# Patient Record
Sex: FEMALE | Race: WHITE | NOT HISPANIC OR LATINO | Employment: FULL TIME | URBAN - METROPOLITAN AREA
[De-identification: names, ages, dates, MRNs, and addresses within clinical notes are randomized per-mention and may not be internally consistent; named-entity substitution may affect disease eponyms.]

---

## 2018-03-13 ENCOUNTER — OFFICE VISIT (OUTPATIENT)
Dept: URGENT CARE | Facility: CLINIC | Age: 43
End: 2018-03-13
Payer: COMMERCIAL

## 2018-03-13 VITALS
SYSTOLIC BLOOD PRESSURE: 150 MMHG | OXYGEN SATURATION: 98 % | TEMPERATURE: 96.9 F | RESPIRATION RATE: 16 BRPM | HEART RATE: 77 BPM | DIASTOLIC BLOOD PRESSURE: 84 MMHG

## 2018-03-13 DIAGNOSIS — H83.09 LABYRINTHITIS, UNSPECIFIED LATERALITY: Primary | ICD-10-CM

## 2018-03-13 PROCEDURE — 99203 OFFICE O/P NEW LOW 30 MIN: CPT | Performed by: PHYSICIAN ASSISTANT

## 2018-03-13 RX ORDER — TAMOXIFEN CITRATE 20 MG/1
20 TABLET ORAL
COMMUNITY
Start: 2017-03-10

## 2018-03-13 RX ORDER — MECLIZINE HYDROCHLORIDE 25 MG/1
25 TABLET ORAL EVERY 8 HOURS PRN
Qty: 30 TABLET | Refills: 0 | Status: SHIPPED | OUTPATIENT
Start: 2018-03-13 | End: 2022-05-18

## 2018-03-13 RX ORDER — ONDANSETRON 4 MG/1
4 TABLET, ORALLY DISINTEGRATING ORAL EVERY 8 HOURS PRN
Qty: 10 TABLET | Refills: 0 | Status: SHIPPED | OUTPATIENT
Start: 2018-03-13 | End: 2022-05-18

## 2018-03-13 NOTE — PATIENT INSTRUCTIONS
Labyrinthitis   WHAT YOU NEED TO KNOW:   What is labyrinthitis? Labyrinthitis is an inflammation of the labyrinth or nerves in the inner ear  The labyrinth helps you hear and keep your balance  Symptoms can make it difficult to walk or do your normal activities, but are not life-threatening  What increases my risk for labyrinthitis? · Infections:  Labyrinthitis usually occurs after a bacterial or viral infection, such as mumps  An infection of the ear or upper respiratory tract, may spread and cause infection and inflammation in the inner ear  · Diseases:  Tumors in the inner ear or problems with blood supply may also cause labyrinthitis  Conditions, such as motion sickness, may also increase your risk of labyrinthitis  · Injury:  An injury to the head may increase your risk for labyrinthitis  · Medicines:  Certain medicines, such as antibiotics, may damage the labyrinth  What are the signs and symptoms of labyrinthitis? Signs and symptoms of labyrinthitis are often sudden and severe  Vertigo is the most common symptom  Vertigo is a feeling that everything is moving, swirling, or spinning  You may feel like you are being pulled down to the floor or toward your side  You may also have nausea and vomiting  You may also have any of the following:  · Abnormal fast movement of the eyes    · Pale, cold, or sweaty skin    · Tinnitus (ringing or buzzing in the ears) or hearing loss  How is labyrinthitis diagnosed? Your healthcare provider will ask you about your symptoms  He may ask you about health conditions, injuries, and medicines you take or have taken in the past  He will examine you  Your healthcare provider may do positional testing by moving your head in different directions  You may need any of the following tests:  · Body fluid sample: Your healthcare provider may need samples of fluid from a wound or your ear, nose, or throat   These samples may show if an infection is causing your labyrinthitis  · Auditory brainstem response (ABR) test: This test plays a series of clicks through headsets on your ears  A special machine is used to measure how your cochlea and nerves react to the clicks  · CT scan: This test is also called a CAT scan  An x-ray machine uses a computer to take pictures of your skull and brain  The pictures may show the cause of the labyrinthitis  You may be given dye before the pictures are taken to help healthcare providers see the pictures better  Tell the healthcare provider if you have ever had an allergic reaction to contrast dye  · MRI:  This scan uses powerful magnets and a computer to take pictures of your brain  It will also take pictures of the vessels and structures in your head  You may be given dye to help the pictures show up better  Tell the healthcare provider if you have ever had an allergic reaction to contrast dye  Do not enter the MRI room with anything metal  Metal can cause serious injury  Tell the healthcare provider if you have any metal in or on your body  · Electronystagmography: This test is also called an ENG  An ENG is done to test for problems you may have with balance or dizziness  Sticky pads with wires are placed on the skin around your eyes  The wires are connected to a special machine that records information during your ENG  Warm and cool air or water is put into your ears while your eye movements are recorded  Do not drink alcohol or eat a heavy meal before this test  You may feel dizzy or sick to your stomach after the test   How is labyrinthitis treated? Treatment will depend on the condition causing your labyrinthitis  You may have any of the following:  · Vestibular therapy: This is also known as vestibular and balance rehabilitation therapy (VBRT)  It is used to help decrease your dizziness, improve your balance, and prevent injuries  · Medicines:      Emma Folk medicine: This may help decrease dizziness  ¨ Antinausea medicine: This medicine may be given to calm your stomach and prevent vomiting  ¨ Antiviral medicine: This is given to treat an infection caused by a virus  ¨ Antibiotics: This medicine may be given if your labyrinthitis is caused by a bacterial infection  ¨ Steroids: This medicine may be given to decrease inflammation  · Surgery:  Surgery may correct certain problems in the ears  Excess fluids can be removed or drained through a small slit in the eardrum  Surgery may include removing bone or cutting a nerve in your inner ear  What are the risks of not treating labyrinthitis? If left untreated, the infection may spread and cause more serious problems  It may cause nerve damage and permanent hearing loss  It may also lead to meningitis (swelling of the coverings of the brain) or a brain infection  You may have a higher risk for accidents or falls  Labyrinthitis may also make it difficult for you to continue your usual activities and may affect your quality of life  How can I manage my symptoms? · Be calm and take slow, deep breaths  · Sit or lie down right away when you feel dizzy  · Keep your head as still as possible and do not change positions quickly  Move slowly and let yourself get used to one position before moving to another position  · Do not walk without help, drive a car, or operate heavy machinery when you feel dizzy  Where can I find more information? · [employer]   P O  [address] [facility] , [address] [place] [address] Drive  Phone: [phone]  Phone: [phone]  When should I contact my healthcare provider? · You have a fever  · You have ear pain  · You feel weak, tired, or lose weight without trying  · Your symptoms keep coming back or get worse  · You have questions or concerns about your condition or care  When should I seek immediate care or call [facility]?    · You are unable to keep any fluids, food, or medicine down without vomiting  · You have a dry mouth or cracked lips  · You have a headache, stiff neck, and a fever  · Your heartbeat is fast or irregular  · You are unable to urinate  · You have blood, pus, or fluid coming out of your ears  · You have dizzy spells that last longer than they usually do  · You have difficulty speaking or thinking clearly  · You have weakness or numbness in part of your body  · You have changes in vision or shortness of breath  CARE AGREEMENT:   You have the right to help plan your care  Learn about your health condition and how it may be treated  Discuss treatment options with your caregivers to decide what care you want to receive  You always have the right to refuse treatment  The above information is an  only  It is not intended as medical advice for individual conditions or treatments  Talk to your doctor, nurse or pharmacist before following any medical regimen to see if it is safe and effective for you  © 2017 [employer] Information is for End User's use only and may not be sold, redistributed or otherwise used for commercial purposes  All illustrations and images included in CareNotes® are the copyrighted property of A D A M , Inc  or Joseph Espinosa

## 2018-03-13 NOTE — PROGRESS NOTES
[facility]        NAME: Landen Stokes is a 43 y o  female  : 1975    MRN: [id number]  DATE: 2018  TIME: 2:20 PM    Assessment and Plan   Labyrinthitis, unspecified laterality [H83 09]  1  Labyrinthitis, unspecified laterality  meclizine (ANTIVERT) 25 mg tablet    ondansetron (ZOFRAN-ODT) 4 mg disintegrating tablet         Patient Instructions       Follow up with PCP in 3-5 days  Proceed to  ER if symptoms worsen  Chief Complaint     Chief Complaint   Patient presents with    Dizziness     today    Vomiting         History of Present Illness       Patient woke up this morning with acute onset of dizziness  It is associated with some nausea  She does state that the room is actually spinning clockwise  Symptoms worsen when she moves her head  The She denies any ear pain sore throat cough chest pain shortness of breath  Review of Systems   Review of Systems   Constitutional: Negative for chills and fever  HENT: Negative for ear pain, postnasal drip, rhinorrhea, sore throat and trouble swallowing  Eyes: Negative for redness  Respiratory: Negative for cough, shortness of breath and wheezing  Cardiovascular: Negative for chest pain  Gastrointestinal: Positive for nausea (secondary to vertigo)  Negative for abdominal pain, diarrhea and vomiting  Musculoskeletal: Negative for arthralgias and myalgias  Skin: Negative for rash  Neurological: Negative for weakness, numbness and headaches  Hematological: Negative for adenopathy           Current Medications       Current Outpatient Prescriptions:     COPPER PO, 1 each by Intrauterine route, Disp: , Rfl:     tamoxifen (NOLVADEX) 20 mg tablet, Take 20 mg by mouth, Disp: , Rfl:     meclizine (ANTIVERT) 25 mg tablet, Take 1 tablet (25 mg total) by mouth every 8 (eight) hours as needed for dizziness for up to 5 days, Disp: 30 tablet, Rfl: 0    ondansetron (ZOFRAN-ODT) 4 mg disintegrating tablet, Take 1 tablet (4 mg total) by mouth every 8 (eight) hours as needed for nausea or vomiting for up to 10 doses, Disp: 10 tablet, Rfl: 0    Current Allergies     Allergies as of 03/13/2018    (No Known Allergies)            The following portions of the patient's history were reviewed and updated as appropriate: allergies, current medications, past family history, past medical history, past social history, past surgical history and problem list      Past Medical History:   Diagnosis Date    Cancer [facility])     Contraception        History reviewed  No pertinent surgical history  No family history on file  Medications have been verified  Objective   /84   Pulse 77   Temp (!) 96 9 °F (36 1 °C)   Resp 16   SpO2 98%        Physical Exam     Physical Exam   Constitutional: She is oriented to person, place, and time  She appears well-developed and well-nourished  HENT:   Head: Normocephalic and atraumatic  Right Ear: External ear normal    Left Ear: External ear normal    Nose: Nose normal    Mouth/Throat: Oropharynx is clear and moist    Eyes: Conjunctivae are normal    Neck: Neck supple  Cardiovascular: Normal rate, regular rhythm and normal heart sounds  Pulmonary/Chest: Effort normal and breath sounds normal    Lymphadenopathy:     She has no cervical adenopathy  Neurological: She is alert and oriented to person, place, and time  Skin: Skin is warm and dry  Psychiatric: She has a normal mood and affect

## 2021-04-06 DIAGNOSIS — Z23 ENCOUNTER FOR IMMUNIZATION: ICD-10-CM

## 2022-01-05 ENCOUNTER — TELEPHONE (OUTPATIENT)
Dept: OBGYN CLINIC | Facility: MEDICAL CENTER | Age: 47
End: 2022-01-05

## 2022-05-18 ENCOUNTER — OFFICE VISIT (OUTPATIENT)
Dept: INTERNAL MEDICINE CLINIC | Facility: CLINIC | Age: 47
End: 2022-05-18
Payer: COMMERCIAL

## 2022-05-18 VITALS
TEMPERATURE: 98.5 F | OXYGEN SATURATION: 98 % | WEIGHT: 159.38 LBS | HEART RATE: 80 BPM | BODY MASS INDEX: 24.15 KG/M2 | DIASTOLIC BLOOD PRESSURE: 80 MMHG | HEIGHT: 68 IN | SYSTOLIC BLOOD PRESSURE: 138 MMHG

## 2022-05-18 DIAGNOSIS — Z17.0 CARCINOMA OF RIGHT BREAST IN FEMALE, ESTROGEN RECEPTOR POSITIVE, UNSPECIFIED SITE OF BREAST (HCC): ICD-10-CM

## 2022-05-18 DIAGNOSIS — Z12.12 ENCOUNTER FOR SCREENING FOR COLORECTAL MALIGNANT NEOPLASM: ICD-10-CM

## 2022-05-18 DIAGNOSIS — Z23 ENCOUNTER FOR VACCINATION: ICD-10-CM

## 2022-05-18 DIAGNOSIS — Z02.89 ENCOUNTER FOR PHYSICAL EXAMINATION RELATED TO EMPLOYMENT: ICD-10-CM

## 2022-05-18 DIAGNOSIS — Z13.0 SCREENING FOR DEFICIENCY ANEMIA: ICD-10-CM

## 2022-05-18 DIAGNOSIS — R43.8 BITTER TASTE: ICD-10-CM

## 2022-05-18 DIAGNOSIS — Z11.59 NEED FOR HEPATITIS C SCREENING TEST: ICD-10-CM

## 2022-05-18 DIAGNOSIS — Z78.0 POSTMENOPAUSAL: ICD-10-CM

## 2022-05-18 DIAGNOSIS — Z13.220 SCREENING FOR LIPID DISORDERS: ICD-10-CM

## 2022-05-18 DIAGNOSIS — Z00.00 WELL ADULT EXAM: Primary | ICD-10-CM

## 2022-05-18 DIAGNOSIS — Z13.31 NEGATIVE DEPRESSION SCREENING: ICD-10-CM

## 2022-05-18 DIAGNOSIS — C50.911 CARCINOMA OF RIGHT BREAST IN FEMALE, ESTROGEN RECEPTOR POSITIVE, UNSPECIFIED SITE OF BREAST (HCC): ICD-10-CM

## 2022-05-18 DIAGNOSIS — Z13.1 SCREENING FOR DIABETES MELLITUS (DM): ICD-10-CM

## 2022-05-18 DIAGNOSIS — Z12.11 SCREENING FOR COLON CANCER: ICD-10-CM

## 2022-05-18 DIAGNOSIS — J30.2 SEASONAL ALLERGIES: ICD-10-CM

## 2022-05-18 DIAGNOSIS — Z13.29 SCREENING FOR THYROID DISORDER: ICD-10-CM

## 2022-05-18 DIAGNOSIS — Z12.11 ENCOUNTER FOR SCREENING FOR COLORECTAL MALIGNANT NEOPLASM: ICD-10-CM

## 2022-05-18 DIAGNOSIS — R49.0 HOARSENESS OF VOICE: ICD-10-CM

## 2022-05-18 PROCEDURE — 3008F BODY MASS INDEX DOCD: CPT | Performed by: INTERNAL MEDICINE

## 2022-05-18 PROCEDURE — 1036F TOBACCO NON-USER: CPT | Performed by: INTERNAL MEDICINE

## 2022-05-18 PROCEDURE — 90471 IMMUNIZATION ADMIN: CPT | Performed by: INTERNAL MEDICINE

## 2022-05-18 PROCEDURE — 3725F SCREEN DEPRESSION PERFORMED: CPT | Performed by: INTERNAL MEDICINE

## 2022-05-18 PROCEDURE — 90715 TDAP VACCINE 7 YRS/> IM: CPT | Performed by: INTERNAL MEDICINE

## 2022-05-18 PROCEDURE — 99203 OFFICE O/P NEW LOW 30 MIN: CPT | Performed by: INTERNAL MEDICINE

## 2022-05-18 RX ORDER — LEVOCETIRIZINE DIHYDROCHLORIDE 5 MG/1
5 TABLET, FILM COATED ORAL EVERY EVENING
Qty: 90 TABLET | Refills: 1 | Status: SHIPPED | OUTPATIENT
Start: 2022-05-18

## 2022-05-18 RX ORDER — LETROZOLE 2.5 MG/1
TABLET, FILM COATED ORAL
COMMUNITY
Start: 2022-03-07

## 2022-05-18 RX ORDER — FLUTICASONE PROPIONATE 50 MCG
1 SPRAY, SUSPENSION (ML) NASAL DAILY
Qty: 16 G | Refills: 0 | Status: SHIPPED | OUTPATIENT
Start: 2022-05-18

## 2022-05-18 NOTE — PROGRESS NOTES
Assessment/Plan:  Problem List Items Addressed This Visit        Other    Postmenopausal    Carcinoma of right breast, estrogen receptor positive ([facility] [address] )    Relevant Medications    letrozole (FEMARA) 2 5 mg tablet      Other Visit Diagnoses     Well adult exam    -  Primary    Relevant Orders    CBC and differential    Comprehensive metabolic panel    Hemoglobin A1C    Hepatitis C antibody    Lipid Panel with Direct LDL reflex    TSH, 3rd generation with Free T4 reflex    TDAP VACCINE GREATER THAN OR EQUAL TO 6YO IM    Screening for lipid disorders        Relevant Orders    Lipid Panel with Direct LDL reflex    Screening for thyroid disorder        Relevant Orders    TSH, 3rd generation with Free T4 reflex    Screening for deficiency anemia        Relevant Orders    CBC and differential    Screening for diabetes mellitus (DM)        Relevant Orders    Comprehensive metabolic panel    Hemoglobin A1C    Need for hepatitis C screening test        Relevant Orders    Hepatitis C antibody    Encounter for screening for colorectal malignant neoplasm        Relevant Orders    Cologuard    Bitter taste        Relevant Medications    fluticasone (FLONASE) 50 mcg/act nasal spray    levocetirizine (XYZAL) 5 MG tablet    Negative depression screening        Encounter for physical examination related to employment        Seasonal allergies        Relevant Medications    fluticasone (FLONASE) 50 mcg/act nasal spray    levocetirizine (XYZAL) 5 MG tablet    Screening for colon cancer        Hoarseness of voice        Relevant Medications    fluticasone (FLONASE) 50 mcg/act nasal spray    levocetirizine (XYZAL) 5 MG tablet    Encounter for vaccination        Relevant Orders    TDAP VACCINE GREATER THAN OR EQUAL TO 6YO IM           Diagnoses and all orders for this visit:    Well adult exam  -     CBC and differential; Future  -     Comprehensive metabolic panel;  Future  -     Hemoglobin A1C; Future  -     Hepatitis C antibody; Future  -     Lipid Panel with Direct LDL reflex; Future  -     TSH, 3rd generation with Free T4 reflex; Future  -     TDAP VACCINE GREATER THAN OR EQUAL TO 6YO IM    Screening for lipid disorders  -     Lipid Panel with Direct LDL reflex; Future    Screening for thyroid disorder  -     TSH, 3rd generation with Free T4 reflex; Future    Screening for deficiency anemia  -     CBC and differential; Future    Screening for diabetes mellitus (DM)  -     Comprehensive metabolic panel; Future  -     Hemoglobin A1C; Future    Need for hepatitis C screening test  -     Hepatitis C antibody; Future    Carcinoma of right breast in female, estrogen receptor positive, unspecified site of breast ([facility] [address] )    Postmenopausal    Encounter for screening for colorectal malignant neoplasm  -     Cologuard    Bitter taste  -     fluticasone (FLONASE) 50 mcg/act nasal spray; 1 spray into each nostril in the morning   -     levocetirizine (XYZAL) 5 MG tablet; Take 1 tablet (5 mg total) by mouth every evening    Negative depression screening    Encounter for physical examination related to employment    Seasonal allergies  -     fluticasone (FLONASE) 50 mcg/act nasal spray; 1 spray into each nostril in the morning   -     levocetirizine (XYZAL) 5 MG tablet; Take 1 tablet (5 mg total) by mouth every evening    Screening for colon cancer    Hoarseness of voice  -     fluticasone (FLONASE) 50 mcg/act nasal spray; 1 spray into each nostril in the morning   -     levocetirizine (XYZAL) 5 MG tablet; Take 1 tablet (5 mg total) by mouth every evening    Encounter for vaccination  -     TDAP VACCINE GREATER THAN OR EQUAL TO 6YO IM    Other orders  -     letrozole (FEMARA) 2 5 mg tablet      No problem-specific Assessment & Plan notes found for this encounter  A/P: Doing ok and will check labs, including HIV and Hep c  Discussed BMI and will give info on diet and exercises  Discussed vaccines will up date her Td   Suspect allergies causing the taste and voice changes  Trial of xyzal and INS  If persists, refer to ENT  Order CRC  Continue f/u with the breast specialist  Continue current treatment and RTC one year for routine  Subjective:      Patient ID: Kenyon Velasquez is a 55 y o  female  WF, former pt of our practice, but has not seen a PCP in over five years, presents to establish and needs work insurance form filled out  Junito Wagoner PMH includes breast cancer and post layla  PSH of  bilat mastectomy  Denies being a  Smoker and social ETOH  Doing ok and no c/o's except for several months of hoarseness, PND, but no heartburn    Remains active w/o difficulty and no falls  Denies depression  No suicidal or violent ideations  Working full time as a middle school educator  No recent travel  No fever, chills, or sweats  No unexplained wt change  Denies CP, SOB, palpitations, edema, orthopnea, or PND  No sz or syncope  No changes in bowel or bladder habits  No trouble swallowing  Appetite and sleep are good  Sees both a DDS and an eye doctor  Currently due for labs, vaccines, and CRC   The following portions of the patient's history were reviewed and updated as appropriate:   She has a past medical history of Cancer ([facility] [address] ) and Contraception  ,  does not have any pertinent problems on file  ,   has a past surgical history that includes Breast surgery  ,  family history includes Diabetes in her father; Hypertension in her mother; Narcolepsy in her sister  ,   reports that she has never smoked  She has never used smokeless tobacco  She reports current alcohol use  She reports that she does not use drugs  ,  has No Known Allergies     Current Outpatient Medications   Medication Sig Dispense Refill    fluticasone (FLONASE) 50 mcg/act nasal spray 1 spray into each nostril in the morning   16 g 0    letrozole (FEMARA) 2 5 mg tablet       levocetirizine (XYZAL) 5 MG tablet Take 1 tablet (5 mg total) by mouth every evening 90 tablet 1    tamoxifen (NOLVADEX) 20 mg tablet Take 20 mg by mouth       No current facility-administered medications for this visit  Review of Systems   Constitutional: Negative for activity change, chills, diaphoresis, fatigue and fever  HENT: Positive for postnasal drip and voice change  Negative for congestion, ear discharge, ear pain, facial swelling, rhinorrhea, sinus pressure, sinus pain, sneezing, sore throat and trouble swallowing  Eyes: Negative for visual disturbance  Respiratory: Negative for cough, chest tightness, shortness of breath and wheezing  Cardiovascular: Negative for chest pain, palpitations and leg swelling  Gastrointestinal: Negative for abdominal pain, constipation, diarrhea, nausea and vomiting  Endocrine: Negative for cold intolerance and heat intolerance  Genitourinary: Negative for difficulty urinating, dysuria and frequency  Musculoskeletal: Negative for arthralgias, gait problem and myalgias  Neurological: Negative for dizziness, seizures, syncope, weakness, light-headedness and headaches  Psychiatric/Behavioral: Negative for confusion, dysphoric mood, sleep disturbance and suicidal ideas  The patient is not nervous/anxious  PHQ-2/9 Depression Screening    Little interest or pleasure in doing things: 0 - not at all  Feeling down, depressed, or hopeless: 0 - not at all  PHQ-2 Score: 0  PHQ-2 Interpretation: Negative depression screen        Objective:  Vitals:    05/18/22 1521   BP: 138/80   Pulse: 80   Temp: 98 5 °F (36 9 °C)   SpO2: 98%   Weight: 72 3 kg (159 lb 6 oz)   Height: 5' 7 5" (1 715 m)     Body mass index is 24 59 kg/m²  Physical Exam  Vitals and nursing note reviewed  Constitutional:       General: She is not in acute distress  Appearance: Normal appearance  She is not ill-appearing  HENT:      Head: Normocephalic and atraumatic  Right Ear: Tympanic membrane, ear canal and external ear normal  There is no impacted cerumen        Left Ear: Tympanic membrane, ear canal and external ear normal  There is no impacted cerumen  Nose: No congestion or rhinorrhea  Comments: Turbinates red and inflamed  Mouth/Throat:      Mouth: Mucous membranes are moist       Pharynx: Posterior oropharyngeal erythema present  Comments: Cobblestoning with uvula swollen  Eyes:      Extraocular Movements: Extraocular movements intact  Conjunctiva/sclera: Conjunctivae normal       Pupils: Pupils are equal, round, and reactive to light  Neck:      Vascular: No carotid bruit  Cardiovascular:      Rate and Rhythm: Normal rate and regular rhythm  Heart sounds: Normal heart sounds  Pulmonary:      Effort: Pulmonary effort is normal  No respiratory distress  Breath sounds: Normal breath sounds  No wheezing or rales  Abdominal:      General: Bowel sounds are normal  There is no distension  Palpations: Abdomen is soft  There is no mass  Tenderness: There is no abdominal tenderness  There is no right CVA tenderness, left CVA tenderness, guarding or rebound  Hernia: No hernia is present  Musculoskeletal:         General: No swelling, tenderness or deformity  Normal range of motion  Cervical back: Normal range of motion and neck supple  No rigidity or tenderness  Right lower leg: No edema  Left lower leg: No edema  Lymphadenopathy:      Cervical: No cervical adenopathy  Neurological:      General: No focal deficit present  Mental Status: She is alert and oriented to person, place, and time  Mental status is at baseline  Cranial Nerves: No cranial nerve deficit  Motor: No weakness  Coordination: Coordination normal       Gait: Gait normal       Deep Tendon Reflexes: Reflexes normal    Psychiatric:         Mood and Affect: Mood normal          Behavior: Behavior normal          Thought Content: Thought content normal          Judgment: Judgment normal          BMI Counseling: Body mass index is 24 59 kg/m²   The BMI is above normal  Nutrition recommendations include reducing portion sizes, decreasing overall calorie intake, reducing intake of saturated fat and trans fat and reducing intake of cholesterol  Exercise recommendations include moderate aerobic physical activity for 150 minutes/week

## 2022-05-18 NOTE — PATIENT INSTRUCTIONS
Weight Management   AMBULATORY CARE:   Why it is important to manage your weight:  Being overweight increases your risk of health conditions such as heart disease, high blood pressure, type 2 diabetes, and certain types of cancer  It can also increase your risk for osteoarthritis, sleep apnea, and other respiratory problems  Aim for a slow, steady weight loss  Even a small amount of weight loss can lower your risk of health problems  Risks of being overweight:  Extra weight can cause many health problems, including the following:  · Diabetes (high blood sugar level)    · High blood pressure or high cholesterol    · Heart disease    · Stroke    · Gallbladder or liver disease    · Cancer of the colon, breast, prostate, liver, or kidney    · Sleep apnea    · Arthritis or gout    Screening  is done to check for health conditions before you have signs or symptoms  If you are 28to 79years old, your blood sugar level may be checked every 3 years for signs of prediabetes or diabetes  Your healthcare provider will check your blood pressure at each visit  High blood pressure can lead to a stroke or other problems  Your provider may check for signs of heart disease, cancer, or other health problems  How to lose weight safely:  A safe and healthy way to lose weight is to eat fewer calories and get regular exercise  · You can lose up about 1 pound a week by decreasing the number of calories you eat by 500 calories each day  You can decrease calories by eating smaller portion sizes or by cutting out high-calorie foods  Read labels to find out how many calories are in the foods you eat  · You can also burn calories with exercise such as walking, swimming, or biking  You will be more likely to keep weight off if you make these changes part of your lifestyle  Exercise at least 30 minutes per day on most days of the week   You can also fit in more physical activity by taking the stairs instead of the elevator or parking farther away from stores  Ask your healthcare provider about the best exercise plan for you  Healthy meal plan for weight management:  A healthy meal plan includes a variety of foods, contains fewer calories, and helps you stay healthy  A healthy meal plan includes the following:     · Eat whole-grain foods more often  A healthy meal plan should contain fiber  Fiber is the part of grains, fruits, and vegetables that is not broken down by your body  Whole-grain foods are healthy and provide extra fiber in your diet  Some examples of whole-grain foods are whole-wheat breads and pastas, oatmeal, brown rice, and bulgur  · Eat a variety of vegetables every day  Include dark, leafy greens such as spinach, kale, carlene greens, and mustard greens  Eat yellow and orange vegetables such as carrots, sweet potatoes, and winter squash  · Eat a variety of fruits every day  Choose fresh or canned fruit (canned in its own juice or light syrup) instead of juice  Fruit juice has very little or no fiber  · Eat low-fat dairy foods  Drink fat-free (skim) milk or 1% milk  Eat fat-free yogurt and low-fat cottage cheese  Try low-fat cheeses such as mozzarella and other reduced-fat cheeses  · Choose meat and other protein foods that are low in fat  Choose beans or other legumes such as split peas or lentils  Choose fish, skinless poultry (chicken or turkey), or lean cuts of red meat (beef or pork)  Before you cook meat or poultry, cut off any visible fat  · Use less fat and oil  Try baking foods instead of frying them  Add less fat, such as margarine, sour cream, regular salad dressing and mayonnaise to foods  Eat fewer high-fat foods  Some examples of high-fat foods include french fries, doughnuts, ice cream, and cakes  · Eat fewer sweets  Limit foods and drinks that are high in sugar  This includes candy, cookies, regular soda, and sweetened drinks  Ways to decrease calories:   · Eat smaller portions  ? Use a small plate with smaller servings  ? Do not eat second helpings  ? When you eat at a restaurant, ask for a box and place half of your meal in the box before you eat  ? Share an entrée with someone else  · Replace high-calorie snacks with healthy, low-calorie snacks  ? Choose fresh fruit, vegetables, fat-free rice cakes, or air-popped popcorn instead of potato chips, nuts, or chocolate  ? Choose water or calorie-free drinks instead of soda or sweetened drinks  · Do not shop for groceries when you are hungry  You may be more likely to make unhealthy food choices  Take a grocery list of healthy foods and shop after you have eaten  · Eat regular meals  Do not skip meals  Skipping meals can lead to overeating later in the day  This can make it harder for you to lose weight  Eat a healthy snack in place of a meal if you do not have time to eat a regular meal  Talk with a dietitian to help you create a meal plan and schedule that is right for you  Other things to consider as you try to lose weight:   · Be aware of situations that may give you the urge to overeat, such as eating while watching television  Find ways to avoid these situations  For example, read a book, go for a walk, or do crafts  · Meet with a weight loss support group or friends who are also trying to lose weight  This may help you stay motivated to continue working on your weight loss goals  © Copyright [employer] 2022 Information is for End User's use only and may not be sold, redistributed or otherwise used for commercial purposes  All illustrations and images included in CareNotes® are the copyrighted property of A D A M , Inc  or [facility] Sridhar Leo   The above information is an  only  It is not intended as medical advice for individual conditions or treatments  Talk to your doctor, nurse or pharmacist before following any medical regimen to see if it is safe and effective for you      Low Fat Diet   AMBULATORY CARE:   A low-fat diet  is an eating plan that is low in total fat, unhealthy fat, and cholesterol  You may need to follow a low-fat diet if you have trouble digesting or absorbing fat  You may also need to follow this diet if you have high cholesterol  You can also lower your cholesterol by increasing the amount of fiber in your diet  Soluble fiber is a type of fiber that helps to decrease cholesterol levels  Different types of fat in food:   · Limit unhealthy fats  A diet that is high in cholesterol, saturated fat, and trans fat may cause unhealthy cholesterol levels  Unhealthy cholesterol levels increase your risk of heart disease  ? Cholesterol:  Limit intake of cholesterol to less than 200 mg per day  Cholesterol is found in meat, eggs, and dairy  ? Saturated fat:  Limit saturated fat to less than 7% of your total daily calories  Ask your dietitian how many calories you need each day  Saturated fat is found in butter, cheese, ice cream, whole milk, and palm oil  Saturated fat is also found in meat, such as beef, pork, chicken skin, and processed meats  Processed meats include sausage, hot dogs, and bologna  ? Trans fat:  Avoid trans fat as much as possible  Trans fat is used in fried and baked foods  Foods that say trans fat free on the label may still have up to 0 5 grams of trans fat per serving  · Include healthy fats  Replace foods that are high in saturated and trans fat with foods high in healthy fats  This may help to decrease high cholesterol levels  ? Monounsaturated fats: These are found in avocados, nuts, and vegetable oils, such as olive, canola, and sunflower oil  ? Polyunsaturated fats: These can be found in vegetable oils, such as soybean or corn oil  Omega-3 fats can help to decrease the risk of heart disease  Omega-3 fats are found in fish, such as salmon, herring, trout, and tuna   Omega-3 fats can also be found in plant foods, such as walnuts, flaxseed, soybeans, and canola oil  Foods to limit or avoid:   · Grains:      ? Snacks that are made with partially hydrogenated oils, such as chips, regular crackers, and butter-flavored popcorn    ? High-fat baked goods, such as biscuits, croissants, doughnuts, pies, cookies, and pastries    · Dairy:      ? Whole milk, 2% milk, and yogurt and ice cream made with whole milk    ? Half and half creamer, heavy cream, and whipping cream    ? Cheese, cream cheese, and sour cream    · Meats and proteins:      ? High-fat cuts of meat (T-bone steak, regular hamburger, and ribs)    ? Fried meat, poultry (turkey and chicken), and fish    ? Poultry (chicken and turkey) with skin    ? Cold cuts (salami or bologna), hot dogs, cody, and sausage    ? Whole eggs and egg yolks    · Vegetables and fruits with added fat:      ? Fried vegetables or vegetables in butter or high-fat sauces, such as cream or cheese sauces    ? Fried fruit or fruit served with butter or cream    · Fats:      ? Butter, stick margarine, and shortening    ? Coconut, palm oil, and palm kernel oil    Foods to include:   · Grains:      ? Whole-grain breads, cereals, pasta, and brown rice    ? Low-fat crackers and pretzels    · Vegetables and fruits:      ? Fresh, frozen, or canned vegetables (no salt or low-sodium)    ? Fresh, frozen, dried, or canned fruit (canned in light syrup or fruit juice)    ? Avocado    · Low-fat dairy products:      ? Nonfat (skim) or 1% milk    ? Nonfat or low-fat cheese, yogurt, and cottage cheese    · Meats and proteins:      ? Chicken or turkey with no skin    ? Baked or broiled fish    ? Lean beef and pork (loin, round, extra lean hamburger)    ? Beans and peas, unsalted nuts, soy products    ? Egg whites and substitutes    ? Seeds and nuts    · Fats:      ? Unsaturated oil, such as canola, olive, peanut, soybean, or sunflower oil    ? Soft or liquid margarine and vegetable oil spread    ?  Low-fat salad dressing    Other ways to decrease fat:   · Read food labels before you buy foods  Choose foods that have less than 30% of calories from fat  Choose low-fat or fat-free dairy products  Remember that fat free does not mean calorie free  These foods still contain calories, and too many calories can lead to weight gain  · Trim fat from meat and avoid fried food  Trim all visible fat from meat before you cook it  Remove the skin from poultry  Do not velasco meat, fish, or poultry  Bake, roast, boil, or broil these foods instead  Avoid fried foods  Eat a baked potato instead of [place] fries  Steam vegetables instead of sautéing them in butter  · Add less fat to foods  Use imitation cody bits on salads and baked potatoes instead of regular cody bits  Use fat-free or low-fat salad dressings instead of regular dressings  Use low-fat or nonfat butter-flavored topping instead of regular butter or margarine on popcorn and other foods  Ways to decrease fat in recipes:  Replace high-fat ingredients with low-fat or nonfat ones  This may cause baked goods to be drier than usual  You may need to use nonfat cooking spray on pans to prevent food from sticking  You also may need to change the amount of other ingredients, such as water, in the recipe  Try the following:  · Use low-fat or light margarine instead of regular margarine or shortening  · Use lean ground turkey breast or chicken, or lean ground beef (less than 5% fat) instead of hamburger  · Add 1 teaspoon of canola oil to 8 ounces of skim milk instead of using cream or half and half  · Use grated zucchini, carrots, or apples in breads instead of coconut  · Use blenderized, low-fat cottage cheese, plain tofu, or low-fat ricotta cheese instead of cream cheese  · Use 1 egg white and 1 teaspoon of canola oil, or use ¼ cup (2 ounces) of fat-free egg substitute instead of a whole egg       · Replace half of the oil that is called for in a recipe with applesauce when you bake  Use 3 tablespoons of cocoa powder and 1 tablespoon of canola oil instead of a square of baking chocolate  How to increase fiber:  Eat enough high-fiber foods to get 20 to 30 grams of fiber every day  Slowly increase your fiber intake to avoid stomach cramps, gas, and other problems  · Eat 3 ounces of whole-grain foods each day  An ounce is about 1 slice of bread  Eat whole-grain breads, such as whole-wheat bread  Whole wheat, whole-wheat flour, or other whole grains should be listed as the first ingredient on the food label  Replace white flour with whole-grain flour or use half of each in recipes  Whole-grain flour is heavier than white flour, so you may have to add more yeast or baking powder  · Eat a high-fiber cereal for breakfast   Oatmeal is a good source of soluble fiber  Look for cereals that have bran or fiber in the name  Choose whole-grain products, such as brown rice, barley, and whole-wheat pasta  · Eat more beans, peas, and lentils  For example, add beans to soups or salads  Eat at least 5 cups of fruits and vegetables each day  Eat fruits and vegetables with the peel because the peel is high in fiber  © Copyright [employer] 2022 Information is for End User's use only and may not be sold, redistributed or otherwise used for commercial purposes  All illustrations and images included in CareNotes® are the copyrighted property of A D A M , Inc  or [address] [place], [place] [address]   The above information is an  only  It is not intended as medical advice for individual conditions or treatments  Talk to your doctor, nurse or pharmacist before following any medical regimen to see if it is safe and effective for you  Heart Healthy Diet   AMBULATORY CARE:   A heart healthy diet  is an eating plan low in unhealthy fats and sodium (salt)  The plan is high in healthy fats and fiber   A heart healthy diet helps improve your cholesterol levels and lowers your risk for heart disease and stroke  A dietitian will teach you how to read and understand food labels  Heart healthy diet guidelines to follow:   · Choose foods that contain healthy fats  ? Unsaturated fats  include monounsaturated and polyunsaturated fats  Unsaturated fat is found in foods such as soybean, canola, olive, corn, and safflower oils  It is also found in soft tub margarine that is made with liquid vegetable oil  ? Omega-3 fat  is found in certain fish, such as salmon, tuna, and trout, and in walnuts and flaxseed  Eat fish high in omega-3 fats at least 2 times a week  · Get 20 to 30 grams of fiber each day  Fruits, vegetables, whole-grain foods, and legumes (cooked beans) are good sources of fiber  · Limit or do not have unhealthy fats  ? Cholesterol  is found in animal foods, such as eggs and lobster, and in dairy products made from whole milk  Limit cholesterol to less than 200 mg each day  ? Saturated fat  is found in meats, such as cody and hamburger  It is also found in chicken or turkey skin, whole milk, and butter  Limit saturated fat to less than 7% of your total daily calories  ? Trans fat  is found in packaged foods, such as potato chips and cookies  It is also in hard margarine, some fried foods, and shortening  Do not eat foods that contain trans fats  · Limit sodium as directed  You may be told to limit sodium to 2,000 to 2,300 mg each day  Choose low-sodium or no-salt-added foods  Add little or no salt to food you prepare  Use herbs and spices in place of salt  Include the following in your heart healthy plan:  Ask your dietitian or healthcare provider how many servings to have from each of the following food groups:  · Grains:      ? Whole-wheat breads, cereals, and pastas, and brown rice    ? Low-fat, low-sodium crackers and chips    · Vegetables:      ? Broccoli, green beans, green peas, and spinach    ? Collards, kale, and lima beans    ?  Carrots, sweet potatoes, tomatoes, and peppers    ? Canned vegetables with no salt added    · Fruits:      ? Bananas, peaches, pears, and pineapple    ? Grapes, raisins, and dates    ? Oranges, tangerines, grapefruit, orange juice, and grapefruit juice    ? Apricots, mangoes, melons, and papaya    ? Raspberries and strawberries    ? Canned fruit with no added sugar    · Low-fat dairy:      ? Nonfat (skim) milk, 1% milk, and low-fat almond, cashew, or soy milks fortified with calcium    ? Low-fat cheese, regular or frozen yogurt, and cottage cheese    · Meats and proteins:      ? Lean cuts of beef and pork (loin, leg, round), skinless chicken and turkey    ? Legumes, soy products, egg whites, or nuts    Limit or do not include the following in your heart healthy plan:   · Unhealthy fats and oils:      ? Whole or 2% milk, cream cheese, sour cream, or cheese    ? High-fat cuts of beef (T-bone steaks, ribs), chicken or turkey with skin, and organ meats such as liver    ? Butter, stick margarine, shortening, and cooking oils such as coconut or palm oil    · Foods and liquids high in sodium:      ? Packaged foods, such as frozen dinners, cookies, macaroni and cheese, and cereals with more than 300 mg of sodium per serving    ? Vegetables with added sodium, such as instant potatoes, vegetables with added sauces, or regular canned vegetables    ? Cured or smoked meats, such as hot dogs, cody, and sausage    ? High-sodium ketchup, barbecue sauce, salad dressing, pickles, olives, soy sauce, or miso    · Foods and liquids high in sugar:      ? Candy, cake, cookies, pies, or doughnuts    ? Soft drinks (soda), sports drinks, or sweetened tea    ? Canned or dry mixes for cakes, soups, sauces, or gravies    Other healthy heart guidelines:   · Do not smoke  Nicotine and other chemicals in cigarettes and cigars can cause lung and heart damage  Ask your healthcare provider for information if you currently smoke and need help to quit   E-cigarettes or smokeless tobacco still contain nicotine  Talk to your healthcare provider before you use these products  · Limit or do not drink alcohol as directed  Alcohol can damage your heart and raise your blood pressure  Your healthcare provider may give you specific daily and weekly limits  The general recommended limit is 1 drink a day for women 21 or older and for men 72 or older  Do not have more than 3 drinks in a day or 7 in a week  The recommended limit is 2 drinks a day for men 24to 59years of age  Do not have more than 4 drinks in a day or 14 in a week  A drink of alcohol is 12 ounces of beer, 5 ounces of wine, or 1½ ounces of liquor  · Exercise regularly  Exercise can help you maintain a healthy weight and improve your blood pressure and cholesterol levels  Regular exercise can also decrease your risk for heart problems  Ask your healthcare provider about the best exercise plan for you  Do not start an exercise program without asking your healthcare provider  Follow up with your doctor or cardiologist as directed:  Write down your questions so you remember to ask them during your visits  © Copyright [employer] 2022 Information is for End User's use only and may not be sold, redistributed or otherwise used for commercial purposes  All illustrations and images included in CareNotes® are the copyrighted property of [employer]  or [facility] Sridhar Leo   The above information is an  only  It is not intended as medical advice for individual conditions or treatments  Talk to your doctor, nurse or pharmacist before following any medical regimen to see if it is safe and effective for you

## 2022-06-08 ENCOUNTER — TELEPHONE (OUTPATIENT)
Dept: INTERNAL MEDICINE CLINIC | Facility: CLINIC | Age: 47
End: 2022-06-08

## 2022-06-08 NOTE — TELEPHONE ENCOUNTER
----- Message from Kira Alba DO sent at 5/18/2022  3:50 PM EDT -----  Call pt in three weeks and get up date on taste and voice

## 2022-07-06 DIAGNOSIS — R43.8 BITTER TASTE: ICD-10-CM

## 2022-07-06 DIAGNOSIS — J30.2 SEASONAL ALLERGIES: ICD-10-CM

## 2022-07-06 DIAGNOSIS — R49.0 HOARSENESS OF VOICE: ICD-10-CM

## 2022-07-06 RX ORDER — FLUTICASONE PROPIONATE 50 MCG
1 SPRAY, SUSPENSION (ML) NASAL DAILY
Qty: 16 G | Refills: 0 | Status: CANCELLED | OUTPATIENT
Start: 2022-07-06

## 2022-07-06 NOTE — TELEPHONE ENCOUNTER
Pt called back and her voice and taste is back  She is doing much better  Does she still need to take the Flonase? If so can you send a script to [facility]   She still has one more refill on her Xyzal

## 2022-07-25 LAB
HBA1C MFR BLD HPLC: 6 %
HCV AB SER-ACNC: NEGATIVE

## 2022-12-27 DIAGNOSIS — J30.2 SEASONAL ALLERGIES: ICD-10-CM

## 2022-12-27 DIAGNOSIS — R43.8 BITTER TASTE: ICD-10-CM

## 2022-12-27 DIAGNOSIS — R49.0 HOARSENESS OF VOICE: ICD-10-CM

## 2022-12-27 RX ORDER — FLUTICASONE PROPIONATE 50 MCG
SPRAY, SUSPENSION (ML) NASAL
Qty: 16 G | Refills: 5 | Status: SHIPPED | OUTPATIENT
Start: 2022-12-27

## 2022-12-27 RX ORDER — LEVOCETIRIZINE DIHYDROCHLORIDE 5 MG/1
5 TABLET, FILM COATED ORAL EVERY EVENING
Qty: 90 TABLET | Refills: 1 | Status: SHIPPED | OUTPATIENT
Start: 2022-12-27

## 2023-04-30 PROBLEM — Z98.82 H/O BILATERAL BREAST IMPLANTS: Status: ACTIVE | Noted: 2023-01-30

## 2023-04-30 PROBLEM — Z90.13 H/O BILATERAL MASTECTOMY: Status: ACTIVE | Noted: 2023-01-30

## 2023-05-08 ENCOUNTER — OFFICE VISIT (OUTPATIENT)
Dept: INTERNAL MEDICINE CLINIC | Facility: CLINIC | Age: 48
End: 2023-05-08

## 2023-05-08 VITALS
OXYGEN SATURATION: 97 % | HEART RATE: 70 BPM | BODY MASS INDEX: 24.78 KG/M2 | HEIGHT: 68 IN | SYSTOLIC BLOOD PRESSURE: 126 MMHG | DIASTOLIC BLOOD PRESSURE: 78 MMHG | WEIGHT: 163.5 LBS

## 2023-05-08 DIAGNOSIS — Z12.11 SCREEN FOR COLON CANCER: ICD-10-CM

## 2023-05-08 DIAGNOSIS — C50.911 CARCINOMA OF RIGHT BREAST IN FEMALE, ESTROGEN RECEPTOR POSITIVE, UNSPECIFIED SITE OF BREAST (HCC): ICD-10-CM

## 2023-05-08 DIAGNOSIS — Z13.1 SCREENING FOR DIABETES MELLITUS (DM): ICD-10-CM

## 2023-05-08 DIAGNOSIS — Z13.31 NEGATIVE DEPRESSION SCREENING: ICD-10-CM

## 2023-05-08 DIAGNOSIS — Z13.29 SCREENING FOR THYROID DISORDER: ICD-10-CM

## 2023-05-08 DIAGNOSIS — Z17.0 CARCINOMA OF RIGHT BREAST IN FEMALE, ESTROGEN RECEPTOR POSITIVE, UNSPECIFIED SITE OF BREAST (HCC): ICD-10-CM

## 2023-05-08 DIAGNOSIS — Z11.4 SCREENING FOR HIV (HUMAN IMMUNODEFICIENCY VIRUS): ICD-10-CM

## 2023-05-08 DIAGNOSIS — Z13.0 SCREENING FOR DEFICIENCY ANEMIA: ICD-10-CM

## 2023-05-08 DIAGNOSIS — Z00.00 WELL ADULT EXAM: Primary | ICD-10-CM

## 2023-05-08 DIAGNOSIS — Z13.220 SCREENING FOR LIPID DISORDERS: ICD-10-CM

## 2023-05-08 DIAGNOSIS — Z02.89 ENCOUNTER FOR COMPLETION OF FORM WITH PATIENT: ICD-10-CM

## 2023-05-08 NOTE — PROGRESS NOTES
Depression Screening and Follow-up Plan: Patient was screened for depression during today's encounter  They screened negative with a PHQ-2 score of 0  Assessment/Plan:  Problem List Items Addressed This Visit        Other    Carcinoma of right breast, estrogen receptor positive ([facility] )   Other Visit Diagnoses     Well adult exam    -  Primary    Relevant Orders    CBC and differential    Comprehensive metabolic panel    Hemoglobin A1C    Lipid Panel with Direct LDL reflex    TSH, 3rd generation with Free T4 reflex    Cologuard    Screening for lipid disorders        Relevant Orders    Lipid Panel with Direct LDL reflex    Screening for diabetes mellitus (DM)        Relevant Orders    Comprehensive metabolic panel    Hemoglobin A1C    Screening for thyroid disorder        Relevant Orders    TSH, 3rd generation with Free T4 reflex    Screening for deficiency anemia        Relevant Orders    CBC and differential    Screen for colon cancer        Relevant Orders    Cologuard    Screening for HIV (human immunodeficiency virus)        Relevant Orders    HIV 1/2 AB/AG w Reflex [facility] for 2 yr old and above    Negative depression screening        Encounter for completion of form with patient               Diagnoses and all orders for this visit:    Well adult exam  -     CBC and differential; Future  -     Comprehensive metabolic panel; Future  -     Hemoglobin A1C; Future  -     Lipid Panel with Direct LDL reflex; Future  -     TSH, 3rd generation with Free T4 reflex; Future  -     Cologuard    Carcinoma of right breast in female, estrogen receptor positive, unspecified site of breast ([facility] )    Screening for lipid disorders  -     Lipid Panel with Direct LDL reflex; Future    Screening for diabetes mellitus (DM)  -     Comprehensive metabolic panel; Future  -     Hemoglobin A1C; Future    Screening for thyroid disorder  -     TSH, 3rd generation with Free T4 reflex;  Future    Screening for deficiency anemia  -     CBC and differential; Future    Screen for colon cancer  -     Cologuard    Screening for HIV (human immunodeficiency virus)  -     HIV 1/2 AB/AG w Reflex [facility] for 2 yr old and above; Future    Negative depression screening    Encounter for completion of form with patient      No problem-specific Assessment & Plan notes found for this encounter  A/P: Doing well and co-morbidities are stable  Labs will be ordered  BMI is good and will continue current program  Will order CRC  Jeffery Nguyen No mental or physical restrictions  No evidence of communicable diseases  Continue current treatment and RTC one year for annual  Work form for employee health filed  Subjective:      Patient ID: Nevaeh Mejias is a 52 y o  female  WF presents for a well exam  Doing ok and no c/o's  Breast Ca is in remission  Remains active w/o difficulty and no falls  Denies depresion  No suicidal or violent ideations  Working full time teaching  No work related injuries or exposures    No recent travel  No fever, chills, or sweats  No unexplained wt change  Denies CP, SOB, palpitations, edema, orthopnea, or PND  No sz or syncope  No changes in bowel or bladder habits  No trouble swallowing  Appetite and sleep are good  Sees both  a DDS and an eye doctor  No change in PMH, PSH, ALL, OH, SH, or Fhx  Currently due for labs and CRC            The following portions of the patient's history were reviewed and updated as appropriate:   She has a past medical history of Cancer ([address] ) and Contraception  ,  does not have any pertinent problems on file  ,   has a past surgical history that includes Breast surgery  ,  family history includes Diabetes in her father; Hypertension in her mother; Narcolepsy in her sister  ,   reports that she has never smoked  She has never used smokeless tobacco  She reports current alcohol use  She reports that she does not use drugs  ,  has No Known Allergies     Current Outpatient Medications   Medication Sig Dispense Refill   • "tamoxifen (NOLVADEX) 20 mg tablet Take 20 mg by mouth     • fluticasone (FLONASE) 50 mcg/act nasal spray USE 1 SPRAY IN EACH NOSTRIL IN THE MORNING (Patient not taking: Reported on 5/8/2023) 16 g 5   • levocetirizine (XYZAL) 5 MG tablet Take 1 tablet (5 mg total) by mouth every evening (Patient not taking: Reported on 5/8/2023) 90 tablet 1     No current facility-administered medications for this visit  Review of Systems   Constitutional: Negative for activity change, chills, diaphoresis, fatigue and fever  HENT: Negative  Eyes: Negative for visual disturbance  Respiratory: Negative for cough, chest tightness, shortness of breath and wheezing  Cardiovascular: Negative for chest pain, palpitations and leg swelling  Gastrointestinal: Negative for abdominal pain, constipation, diarrhea, nausea and vomiting  Endocrine: Negative for cold intolerance and heat intolerance  Genitourinary: Negative for difficulty urinating, dysuria and frequency  Musculoskeletal: Negative for arthralgias, gait problem and myalgias  Neurological: Negative for dizziness, seizures, syncope, weakness, light-headedness and headaches  Psychiatric/Behavioral: Negative for confusion, dysphoric mood and sleep disturbance  The patient is not nervous/anxious  PHQ-2/9 Depression Screening    Little interest or pleasure in doing things: 0 - not at all  Feeling down, depressed, or hopeless: 0 - not at all  PHQ-2 Score: 0  PHQ-2 Interpretation: Negative depression screen        Objective:  Vitals:    05/08/23 1548   BP: 126/78   Pulse: 70   SpO2: 97%   Weight: 74 2 kg (163 lb 8 oz)   Height: 5' 7 5\" (1 715 m)     Body mass index is 25 23 kg/m²  Physical Exam  Vitals and nursing note reviewed  Constitutional:       General: She is not in acute distress  Appearance: Normal appearance  She is not ill-appearing  HENT:      Head: Normocephalic and atraumatic        Mouth/Throat:      Mouth: Mucous membranes are " moist    Eyes:      Extraocular Movements: Extraocular movements intact  Conjunctiva/sclera: Conjunctivae normal       Pupils: Pupils are equal, round, and reactive to light  Neck:      Vascular: No carotid bruit  Cardiovascular:      Rate and Rhythm: Normal rate and regular rhythm  Heart sounds: Normal heart sounds  No murmur heard  Pulmonary:      Effort: Pulmonary effort is normal  No respiratory distress  Breath sounds: Normal breath sounds  No wheezing, rhonchi or rales  Abdominal:      General: Bowel sounds are normal  There is no distension  Palpations: Abdomen is soft  Tenderness: There is no abdominal tenderness  Musculoskeletal:      Cervical back: Neck supple  Right lower leg: No edema  Left lower leg: No edema  Neurological:      General: No focal deficit present  Mental Status: She is alert and oriented to person, place, and time  Mental status is at baseline  Cranial Nerves: No cranial nerve deficit  Motor: No weakness  Coordination: Coordination normal       Gait: Gait normal    Psychiatric:         Mood and Affect: Mood normal          Behavior: Behavior normal          Thought Content:  Thought content normal          Judgment: Judgment normal

## 2023-07-19 DIAGNOSIS — R43.8 BITTER TASTE: ICD-10-CM

## 2023-07-19 DIAGNOSIS — J30.2 SEASONAL ALLERGIES: ICD-10-CM

## 2023-07-19 DIAGNOSIS — R49.0 HOARSENESS OF VOICE: ICD-10-CM

## 2023-07-19 RX ORDER — LEVOCETIRIZINE DIHYDROCHLORIDE 5 MG/1
TABLET, FILM COATED ORAL
Qty: 90 TABLET | Refills: 3 | Status: SHIPPED | OUTPATIENT
Start: 2023-07-19

## 2023-08-21 LAB — HBA1C MFR BLD HPLC: 5.5 %

## 2024-05-06 ENCOUNTER — OFFICE VISIT (OUTPATIENT)
Dept: INTERNAL MEDICINE CLINIC | Facility: CLINIC | Age: 49
End: 2024-05-06
Payer: COMMERCIAL

## 2024-05-06 VITALS
WEIGHT: 167 LBS | DIASTOLIC BLOOD PRESSURE: 78 MMHG | BODY MASS INDEX: 25.31 KG/M2 | SYSTOLIC BLOOD PRESSURE: 122 MMHG | TEMPERATURE: 97.9 F | HEIGHT: 68 IN | OXYGEN SATURATION: 99 % | HEART RATE: 71 BPM

## 2024-05-06 DIAGNOSIS — Z11.4 SCREENING FOR HIV (HUMAN IMMUNODEFICIENCY VIRUS): ICD-10-CM

## 2024-05-06 DIAGNOSIS — Z12.11 SCREEN FOR COLON CANCER: ICD-10-CM

## 2024-05-06 DIAGNOSIS — Z13.220 SCREENING FOR LIPID DISORDERS: ICD-10-CM

## 2024-05-06 DIAGNOSIS — Z13.29 SCREENING FOR THYROID DISORDER: ICD-10-CM

## 2024-05-06 DIAGNOSIS — Z78.0 POSTMENOPAUSAL: ICD-10-CM

## 2024-05-06 DIAGNOSIS — Z13.1 SCREENING FOR DIABETES MELLITUS (DM): ICD-10-CM

## 2024-05-06 DIAGNOSIS — Z00.00 WELL ADULT EXAM: Primary | ICD-10-CM

## 2024-05-06 DIAGNOSIS — Z13.0 SCREENING FOR DEFICIENCY ANEMIA: ICD-10-CM

## 2024-05-06 DIAGNOSIS — C50.911 CARCINOMA OF RIGHT BREAST IN FEMALE, ESTROGEN RECEPTOR POSITIVE, UNSPECIFIED SITE OF BREAST (HCC): ICD-10-CM

## 2024-05-06 DIAGNOSIS — Z17.0 CARCINOMA OF RIGHT BREAST IN FEMALE, ESTROGEN RECEPTOR POSITIVE, UNSPECIFIED SITE OF BREAST (HCC): ICD-10-CM

## 2024-05-06 DIAGNOSIS — E66.3 OVERWEIGHT (BMI 25.0-29.9): ICD-10-CM

## 2024-05-06 DIAGNOSIS — Z02.89 ENCOUNTER FOR PHYSICAL EXAMINATION RELATED TO EMPLOYMENT: ICD-10-CM

## 2024-05-06 PROCEDURE — 99396 PREV VISIT EST AGE 40-64: CPT | Performed by: INTERNAL MEDICINE

## 2024-05-06 NOTE — PROGRESS NOTES
Depression Screening and Follow-up Plan: Patient was screened for depression during today's encounter. They screened negative with a PHQ-2 score of 0.    Assessment/Plan:  Problem List Items Addressed This Visit          Oncology    Carcinoma of right breast, estrogen receptor positive (HCC)       Obstetrics/Gynecology    Postmenopausal     Other Visit Diagnoses       Well adult exam    -  Primary    Relevant Orders    Hemoglobin A1C    TSH, 3rd generation    Lipid Panel with Direct LDL reflex    HIV 1/2 AB/AG w Reflex [facility] for 2 yr old and above    Screening for lipid disorders        Relevant Orders    Lipid Panel with Direct LDL reflex    Screening for diabetes mellitus (DM)        Relevant Orders    Hemoglobin A1C    Screening for thyroid disorder        Relevant Orders    TSH, 3rd generation    Screening for deficiency anemia        Encounter for physical examination related to employment        Screening for HIV (human immunodeficiency virus)        Relevant Orders    HIV 1/2 AB/AG w Reflex [facility] for 2 yr old and above    Screen for colon cancer        Overweight (BMI 25.0-29.9)                 Diagnoses and all orders for this visit:    Well adult exam  -     Hemoglobin A1C; Future  -     TSH, 3rd generation; Future  -     Lipid Panel with Direct LDL reflex; Future  -     HIV 1/2 AB/AG w Reflex [facility] for 2 yr old and above; Future  -     Cancel: Cologuard    Carcinoma of right breast in female, estrogen receptor positive, unspecified site of breast (HCC)    Postmenopausal    Screening for lipid disorders  -     Lipid Panel with Direct LDL reflex; Future    Screening for diabetes mellitus (DM)  -     Hemoglobin A1C; Future    Screening for thyroid disorder  -     TSH, 3rd generation; Future    Screening for deficiency anemia    Encounter for physical examination related to employment    Screening for HIV (human immunodeficiency virus)  -     HIV 1/2 AB/AG w Reflex [facility] for 2 yr old and above;  Future    Screen for colon cancer  -     Cancel: Cologuard    Overweight (BMI 25.0-29.9)        No problem-specific Assessment & Plan notes found for this encounter.    A/P: Doing well and co-morbidities are stable.  Labs will be ordered. Reminded to return her CRC. Keep f/u with specialist. . No mental or physical restrictions. No evidence of communicable diseases. Continue current treatment and RTC one year for annual.   Work papers filled out.     Subjective:      Patient ID: Pebbles Nagy is a 48 y.o. female.    WF presents for a well exam and work exam. Doing ok and no c/o's. Breast ca is in remission.Remains active w/o difficulty and no falls. Denies depresion. No suicidal or violent ideations. Working full time as a teacher. No recent travel. No fever, chills, or sweats. No unexplained wt change. Denies CP, SOB, palpitations, edema, orthopnea, or PND. No sz or syncope. No changes in bowel or bladder habits. No trouble swallowing. Appetite and sleep are good. Sees both a DDS and an eye doctor. No change in PMH, PSH, ALL, OH, SH, or Fhx. Currently due for labs and CRC..              The following portions of the patient's history were reviewed and updated as appropriate:   She has a past medical history of Cancer (HCC) and Contraception.,  does not have any pertinent problems on file.,   has a past surgical history that includes Breast surgery.,  family history includes Diabetes in her father; Hypertension in her mother; Narcolepsy in her sister.,   reports that she has never smoked. She has never been exposed to tobacco smoke. She has never used smokeless tobacco. She reports that she does not currently use alcohol. She reports that she does not use drugs.,  has No Known Allergies..  Current Outpatient Medications   Medication Sig Dispense Refill    levocetirizine (XYZAL) 5 MG tablet TAKE 1 TABLET EVERY EVENING 90 tablet 3    tamoxifen (NOLVADEX) 20 mg tablet Take 20 mg by mouth      fluticasone (FLONASE)  "50 mcg/act nasal spray USE 1 SPRAY IN EACH NOSTRIL IN THE MORNING (Patient not taking: Reported on 5/8/2023) 16 g 5     No current facility-administered medications for this visit.       Review of Systems   Constitutional:  Negative for activity change, chills, diaphoresis, fatigue and fever.   HENT: Negative.     Eyes:  Negative for visual disturbance.   Respiratory:  Negative for cough, chest tightness, shortness of breath and wheezing.    Cardiovascular:  Negative for chest pain, palpitations and leg swelling.   Gastrointestinal:  Negative for abdominal pain, constipation, diarrhea, nausea and vomiting.   Endocrine: Negative for cold intolerance and heat intolerance.   Genitourinary:  Negative for difficulty urinating, dysuria and frequency.   Musculoskeletal:  Negative for arthralgias, gait problem and myalgias.   Neurological:  Negative for dizziness, seizures, syncope, weakness, light-headedness and headaches.   Psychiatric/Behavioral:  Negative for confusion, dysphoric mood and sleep disturbance. The patient is not nervous/anxious.        PHQ-2/9 Depression Screening    Little interest or pleasure in doing things: 0 - not at all  Feeling down, depressed, or hopeless: 0 - not at all  PHQ-2 Score: 0  PHQ-2 Interpretation: Negative depression screen        Objective:  Vitals:    05/06/24 1549   BP: 122/78   Pulse: 71   Temp: 97.9 °F (36.6 °C)   TempSrc: Tympanic   SpO2: 99%   Weight: 75.8 kg (167 lb)   Height: 5' 7.5\" (1.715 m)     Body mass index is 25.77 kg/m².     Physical Exam  Vitals and nursing note reviewed.   Constitutional:       General: She is not in acute distress.     Appearance: Normal appearance.   HENT:      Head: Normocephalic and atraumatic.      Mouth/Throat:      Mouth: Mucous membranes are moist.   Eyes:      Extraocular Movements: Extraocular movements intact.      Conjunctiva/sclera: Conjunctivae normal.      Pupils: Pupils are equal, round, and reactive to light.   Neck:      Vascular: No " carotid bruit.   Cardiovascular:      Rate and Rhythm: Normal rate and regular rhythm.      Heart sounds: Normal heart sounds. No murmur heard.  Pulmonary:      Effort: Pulmonary effort is normal. No respiratory distress.      Breath sounds: Normal breath sounds. No wheezing, rhonchi or rales.   Abdominal:      General: Bowel sounds are normal. There is no distension.      Palpations: Abdomen is soft.      Tenderness: There is no abdominal tenderness.   Musculoskeletal:      Cervical back: Neck supple.      Right lower leg: No edema.      Left lower leg: No edema.   Neurological:      General: No focal deficit present.      Mental Status: She is alert and oriented to person, place, and time. Mental status is at baseline.   Psychiatric:         Mood and Affect: Mood normal.         Behavior: Behavior normal.         Thought Content: Thought content normal.         Judgment: Judgment normal.

## 2024-09-28 DIAGNOSIS — R43.8 BITTER TASTE: ICD-10-CM

## 2024-09-28 DIAGNOSIS — R49.0 HOARSENESS OF VOICE: ICD-10-CM

## 2024-09-28 DIAGNOSIS — J30.2 SEASONAL ALLERGIES: ICD-10-CM

## 2024-09-29 RX ORDER — LEVOCETIRIZINE DIHYDROCHLORIDE 5 MG/1
TABLET, FILM COATED ORAL
Qty: 90 TABLET | Refills: 1 | Status: SHIPPED | OUTPATIENT
Start: 2024-09-29

## 2024-12-21 ENCOUNTER — OFFICE VISIT (OUTPATIENT)
Dept: URGENT CARE | Facility: CLINIC | Age: 49
End: 2024-12-21
Payer: COMMERCIAL

## 2024-12-21 VITALS
RESPIRATION RATE: 18 BRPM | HEIGHT: 67 IN | SYSTOLIC BLOOD PRESSURE: 126 MMHG | WEIGHT: 162 LBS | OXYGEN SATURATION: 97 % | HEART RATE: 96 BPM | BODY MASS INDEX: 25.43 KG/M2 | DIASTOLIC BLOOD PRESSURE: 70 MMHG | TEMPERATURE: 99.4 F

## 2024-12-21 DIAGNOSIS — J02.9 ACUTE VIRAL PHARYNGITIS: Primary | ICD-10-CM

## 2024-12-21 DIAGNOSIS — J02.9 SORE THROAT: ICD-10-CM

## 2024-12-21 LAB — S PYO AG THROAT QL: NEGATIVE

## 2024-12-21 PROCEDURE — 87880 STREP A ASSAY W/OPTIC: CPT | Performed by: STUDENT IN AN ORGANIZED HEALTH CARE EDUCATION/TRAINING PROGRAM

## 2024-12-21 PROCEDURE — 99213 OFFICE O/P EST LOW 20 MIN: CPT | Performed by: STUDENT IN AN ORGANIZED HEALTH CARE EDUCATION/TRAINING PROGRAM

## 2024-12-21 PROCEDURE — 87070 CULTURE OTHR SPECIMN AEROBIC: CPT | Performed by: STUDENT IN AN ORGANIZED HEALTH CARE EDUCATION/TRAINING PROGRAM

## 2024-12-21 RX ORDER — MAGNESIUM OXIDE 400 MG/1
400 TABLET ORAL DAILY
COMMUNITY

## 2024-12-22 NOTE — PROGRESS NOTES
[facility]        NAME: Pebbles Nagy is a 49 y.o. female  : 1975    MRN: [id number]  DATE: 2024  TIME: 7:53 PM    Assessment and Orders   Acute viral pharyngitis [J02.9]  1. Acute viral pharyngitis        2. Sore throat  POCT rapid strepA    Throat culture    Throat culture            Plan and Discussion      Symptoms and exam consistent with pharyngitis.  Patient's son recently had strep.  Rapid strep was negative.  Will follow-up with throat culture.     Risks and benefits discussed. Patient understands and agrees with the plan.     PATIENT INSTRUCTIONS        If tests have been performed at [facility], our office will contact you with results if changes need to be made to the care plan discussed with you at the visit.  You can [facility] full [facility] on [facility].    Follow up with PCP.     If any of the following occur, please report to your nearest ED for evaluation or call 911.   Difficultly breathing or shortness of breath  Chest pain  Acutely worsening symptoms.         Chief Complaint     Chief Complaint   Patient presents with    Sore Throat     Started today with sore throat and body aches. Son had strep throat.          History of Present Illness       Sore Throat   This is a new problem. The current episode started today. The problem has been gradually worsening. There has been no fever. Associated symptoms include trouble swallowing. Pertinent negatives include no congestion, coughing or vomiting. She has had exposure to strep. She has tried nothing for the symptoms.       Review of Systems   Review of Systems   HENT:  Positive for sore throat and trouble swallowing. Negative for congestion.    Respiratory:  Negative for cough.    Gastrointestinal:  Negative for vomiting.         Current Medications       Current Outpatient Medications:     fluticasone (FLONASE) 50 mcg/act nasal spray, USE 1 SPRAY IN EACH NOSTRIL IN THE MORNING, Disp: 16 g, Rfl: 5     "levocetirizine (XYZAL) 5 MG tablet, TAKE 1 TABLET EVERY EVENING, Disp: 90 tablet, Rfl: 1    tamoxifen (NOLVADEX) 20 mg tablet, Take 20 mg by mouth, Disp: , Rfl:     Lactobacillus Acidophilus POWD, Take by mouth, Disp: , Rfl:     magnesium oxide (MAG-OX) 400 mg tablet, Take 400 mg by mouth daily, Disp: , Rfl:     Current Allergies     Allergies as of 12/21/2024    (No Known Allergies)            The following portions of the patient's history were reviewed and updated as appropriate: allergies, current medications, past family history, past medical history, past social history, past surgical history and problem list.     Past Medical History:   Diagnosis Date    Cancer (HCC)     Contraception        Past Surgical History:   Procedure Laterality Date    BREAST SURGERY         Family History   Problem Relation Age of Onset    Hypertension Mother     Diabetes Father     Narcolepsy Sister          Medications have been verified.        Objective   /70   Pulse 96   Temp 99.4 °F (37.4 °C)   Resp 18   Ht 5' 7\" (1.702 m)   Wt 73.5 kg (162 lb)   SpO2 97%   BMI 25.37 kg/m²   No LMP recorded. Patient is postmenopausal.       Physical Exam     Physical Exam  HENT:      Nose: No congestion.      Mouth/Throat:      Mouth: No oral lesions.      Pharynx: Posterior oropharyngeal erythema present. No oropharyngeal exudate or uvula swelling.      Tonsils: No tonsillar exudate. 0 on the right. 0 on the left.   Cardiovascular:      Rate and Rhythm: Normal rate and regular rhythm.   Lymphadenopathy:      Cervical: No cervical adenopathy.   Neurological:      Mental Status: She is alert.               Mary Valencia DO"

## 2024-12-24 LAB — BACTERIA THROAT CULT: NORMAL

## 2025-05-03 LAB
CHOLEST SERPL-MCNC: 165 MG/DL
CHOLEST/HDLC SERPL: 2.5 {RATIO}
EST. AVERAGE GLUCOSE BLD GHB EST-MCNC: 123 MG/DL
HBA1C MFR BLD: 5.9 %
HDLC SERPL-MCNC: 67 MG/DL (ref 23–92)
LDLC SERPL CALC-MCNC: 89 MG/DL
NONHDLC SERPL-MCNC: 98 MG/DL
TRIGL SERPL-MCNC: 45 MG/DL
TSH SERPL-ACNC: 3.37 UIU/ML (ref 0.45–5.33)

## 2025-05-08 ENCOUNTER — OFFICE VISIT (OUTPATIENT)
Dept: INTERNAL MEDICINE CLINIC | Facility: CLINIC | Age: 50
End: 2025-05-08
Payer: COMMERCIAL

## 2025-05-08 VITALS
TEMPERATURE: 98.8 F | HEIGHT: 68 IN | WEIGHT: 159.13 LBS | OXYGEN SATURATION: 98 % | HEART RATE: 73 BPM | DIASTOLIC BLOOD PRESSURE: 80 MMHG | BODY MASS INDEX: 24.12 KG/M2 | SYSTOLIC BLOOD PRESSURE: 128 MMHG

## 2025-05-08 DIAGNOSIS — Z12.4 SCREENING FOR CERVICAL CANCER: ICD-10-CM

## 2025-05-08 DIAGNOSIS — Z12.11 SCREENING FOR COLORECTAL CANCER: ICD-10-CM

## 2025-05-08 DIAGNOSIS — Z00.00 WELL ADULT EXAM: Primary | ICD-10-CM

## 2025-05-08 DIAGNOSIS — Z11.4 SCREENING FOR HIV (HUMAN IMMUNODEFICIENCY VIRUS): ICD-10-CM

## 2025-05-08 DIAGNOSIS — Z17.0 CARCINOMA OF RIGHT BREAST IN FEMALE, ESTROGEN RECEPTOR POSITIVE, UNSPECIFIED SITE OF BREAST (HCC): ICD-10-CM

## 2025-05-08 DIAGNOSIS — Z02.89 ENCOUNTER FOR PHYSICAL EXAMINATION RELATED TO EMPLOYMENT: ICD-10-CM

## 2025-05-08 DIAGNOSIS — Z78.0 POSTMENOPAUSAL: ICD-10-CM

## 2025-05-08 DIAGNOSIS — C50.911 CARCINOMA OF RIGHT BREAST IN FEMALE, ESTROGEN RECEPTOR POSITIVE, UNSPECIFIED SITE OF BREAST (HCC): ICD-10-CM

## 2025-05-08 DIAGNOSIS — Z12.12 SCREENING FOR COLORECTAL CANCER: ICD-10-CM

## 2025-05-08 DIAGNOSIS — Z00.00 ANNUAL PHYSICAL EXAM: ICD-10-CM

## 2025-05-08 DIAGNOSIS — Z13.29 SCREENING FOR THYROID DISORDER: ICD-10-CM

## 2025-05-08 DIAGNOSIS — Z13.220 SCREENING FOR HYPERLIPIDEMIA: ICD-10-CM

## 2025-05-08 DIAGNOSIS — Z13.0 SCREENING FOR DEFICIENCY ANEMIA: ICD-10-CM

## 2025-05-08 DIAGNOSIS — Z13.1 SCREENING FOR DIABETES MELLITUS: ICD-10-CM

## 2025-05-08 DIAGNOSIS — Z02.89 ENCOUNTER FOR COMPLETION OF FORM WITH PATIENT: ICD-10-CM

## 2025-05-08 PROCEDURE — 99396 PREV VISIT EST AGE 40-64: CPT | Performed by: INTERNAL MEDICINE

## 2025-05-08 NOTE — PROGRESS NOTES
Adult Annual Physical  Name: Pebbles Nagy      : 1975      MRN: [id number]  Encounter Provider: Bob Deng DO  Encounter Date: 2025   Encounter department: [facility]    :  Assessment & Plan  Screening for HIV (human immunodeficiency virus)    Orders:    HIV 1/2 AG/AB w Reflex [facility] for 2 yr old and above; Future    Screening for cervical cancer    Orders:    Ambulatory referral to Obstetrics / Gynecology; Future    Screening for colorectal cancer    Orders:    Cologuard    Well adult exam    Orders:    CBC and differential; Future    Comprehensive metabolic panel; Future    Hemoglobin A1C; Future    Lipid Panel with Direct LDL reflex; Future    TSH, 3rd generation; Future    Screening for diabetes mellitus    Orders:    Comprehensive metabolic panel; Future    Hemoglobin A1C; Future    Screening for thyroid disorder    Orders:    TSH, 3rd generation; Future    Screening for deficiency anemia    Orders:    CBC and differential; Future    Screening for hyperlipidemia    Orders:    Lipid Panel with Direct LDL reflex; Future    Encounter for completion of form with patient         Encounter for physical examination related to employment         Carcinoma of right breast in female, estrogen receptor positive, unspecified site of breast (HCC)         Postmenopausal         Annual physical exam             Preventive Screenings:  - Diabetes Screening: screening up-to-date and orders placed  - Cholesterol Screening: orders placed   - Hepatitis C screening: screening up-to-date   - HIV screening: orders placed   - Cervical cancer screening: screening up-to-date   - Breast cancer screening: has history of breast cancer and screening up-to-date   - Colon cancer screening: orders placed   - Lung cancer screening: screening not indicated     Counseling/Anticipatory Guidance:    - Diet: discussed recommendations for a healthy/well-balanced diet.   - Exercise: the importance of regular  exercise/physical activity was discussed. Recommend exercise 3-5 times per week for at least 30 minutes.      A/P: Doing well and co-morbidities are stable.  Labs will be ordered. Order CRC. . No mental or physical restrictions. No evidence of communicable diseases. Continue current treatment and RTC one year for annual.  Forms filled out.       History of Present Illness     Adult Annual Physical:  Patient presents for annual physical. WF presents for a well exam and work PE. Doing ok and no c/o's. Remains active w/o difficulty and no falls. Denies depression. No suicidal or violent ideations. Breast Ca is in remission. Working full time. No recent travel. No fever, chills, or sweats. No unexplained wt change. Denies CP, SOB, palpitations, edema, orthopnea, or PND. No sz or syncope. No changes in bowel or bladder habits. No trouble swallowing. Appetite and sleep are good. Sees both a DDS and an eye doctor. No change in PMH, PSH, ALL, OH, SH, or Fhx. Currently due for labs, CRC, etc.  .      .     Diet and Physical Activity:  - Diet/Nutrition: no special diet.  - Exercise: walking, vigorous cardiovascular exercise, strength training exercises, 3-4 times a week on average and 30-60 minutes on average.    General Health:  - Sleep: sleeps well and 7-8 hours of sleep on average.  - Hearing: normal hearing bilateral ears and tinnitus.  - Vision: most recent eye exam > 1 year ago and wears glasses.  - Dental: regular dental visits, brushes teeth twice daily and floss regularly.    /GYN Health:  - Follows with GYN: yes.   - Menopause: postmenopausal.   - History of STDs: no  - Contraception: male partner had vasectomy.      Advanced Care Planning:  - Has an advanced directive?: no    - Has a durable medical POA?: no    - ACP document given to patient?: no      Review of Systems   Constitutional:  Negative for activity change, chills, diaphoresis, fatigue and fever.   HENT: Negative.     Eyes:  Negative for visual  "disturbance.   Respiratory:  Negative for cough, chest tightness, shortness of breath and wheezing.    Cardiovascular:  Negative for chest pain, palpitations and leg swelling.   Gastrointestinal:  Negative for abdominal pain, constipation, diarrhea, nausea and vomiting.   Endocrine: Negative for cold intolerance and heat intolerance.   Genitourinary:  Negative for difficulty urinating, dysuria and frequency.   Musculoskeletal:  Negative for arthralgias, gait problem and myalgias.   Neurological:  Negative for dizziness, seizures, syncope, weakness, light-headedness and headaches.   Psychiatric/Behavioral:  Negative for confusion, dysphoric mood and sleep disturbance. The patient is not nervous/anxious.          Objective   /80 (BP Location: Left arm, Patient Position: Sitting)   Pulse 73   Temp 98.8 °F (37.1 °C) (Tympanic)   Ht 5' 7.5\" (1.715 m)   Wt 72.2 kg (159 lb 2 oz)   SpO2 98%   BMI 24.55 kg/m²     Physical Exam  Vitals and nursing note reviewed.   Constitutional:       General: She is not in acute distress.     Appearance: Normal appearance. She is not ill-appearing.   HENT:      Head: Normocephalic and atraumatic.      Mouth/Throat:      Mouth: Mucous membranes are moist.   Eyes:      Extraocular Movements: Extraocular movements intact.      Conjunctiva/sclera: Conjunctivae normal.      Pupils: Pupils are equal, round, and reactive to light.   Neck:      Vascular: No carotid bruit.   Cardiovascular:      Rate and Rhythm: Normal rate and regular rhythm.      Heart sounds: Normal heart sounds. No murmur heard.  Pulmonary:      Effort: Pulmonary effort is normal. No respiratory distress.      Breath sounds: Normal breath sounds. No wheezing, rhonchi or rales.   Abdominal:      General: Bowel sounds are normal. There is no distension.      Palpations: Abdomen is soft.      Tenderness: There is no abdominal tenderness.   Musculoskeletal:      Cervical back: Neck supple.      Right lower leg: No edema. "      Left lower leg: No edema.   Neurological:      General: No focal deficit present.      Mental Status: She is alert and oriented to person, place, and time. Mental status is at baseline.   Psychiatric:         Mood and Affect: Mood normal.         Behavior: Behavior normal.         Thought Content: Thought content normal.         Judgment: Judgment normal.

## 2025-05-08 NOTE — PATIENT INSTRUCTIONS
"Patient Education     Routine physical for adults   The Basics   Written by the doctors and editors at [facility]   What is a physical? -- A physical is a routine visit, or \"check-up,\" with your doctor. You might also hear it called a \"wellness visit\" or \"preventive visit.\"  During each visit, the doctor will:   Ask about your physical and mental health   Ask about your habits, behaviors, and lifestyle   Do an exam   Give you vaccines if needed   Talk to you about any medicines you take   Give advice about your health   Answer your questions  Getting regular check-ups is an important part of taking care of your health. It can help your doctor find and treat any problems you have. But it's also important for preventing health problems.  A routine physical is different from a \"sick visit.\" A sick visit is when you see a doctor because of a health concern or problem. Since physicals are scheduled ahead of time, you can think about what you want to ask the doctor.  How often should I get a physical? -- It depends on your age and health. In general, for people age 21 years and older:   If you are younger than 50 years, you might be able to get a physical every 3 years.   If you are 50 years or older, your doctor might recommend a physical every year.  If you have an ongoing health condition, like diabetes or high blood pressure, your doctor will probably want to see you more often.  What happens during a physical? -- In general, each visit will include:   Physical exam - The doctor or nurse will check your height, weight, heart rate, and blood pressure. They will also look at your eyes and ears. They will ask about how you are feeling and whether you have any symptoms that bother you.   Medicines - It's a good idea to bring a list of all the medicines you take to each doctor visit. Your doctor will talk to you about your medicines and answer any questions. Tell them if you are having any side effects that bother you. You " "should also tell them if you are having trouble paying for any of your medicines.   Habits and behaviors - This includes:   Your diet   Your exercise habits   Whether you smoke, drink alcohol, or use drugs   Whether you are sexually active   Whether you feel safe at home  Your doctor will talk to you about things you can do to improve your health and lower your risk of health problems. They will also offer help and support. For example, if you want to quit smoking, they can give you advice and might prescribe medicines. If you want to improve your diet or get more physical activity, they can help you with this, too.   Lab tests, if needed - The tests you get will depend on your age and situation. For example, your doctor might want to check your:   Cholesterol   Blood sugar   Iron level   Vaccines - The recommended vaccines will depend on your age, health, and what vaccines you already had. Vaccines are very important because they can prevent certain serious or deadly infections.   Discussion of screening - \"Screening\" means checking for diseases or other health problems before they cause symptoms. Your doctor can recommend screening based on your age, risk, and preferences. This might include tests to check for:   Cancer, such as breast, prostate, cervical, ovarian, colorectal, prostate, lung, or skin cancer   Sexually transmitted infections, such as chlamydia and gonorrhea   Mental health conditions like depression and anxiety  Your doctor will talk to you about the different types of screening tests. They can help you decide which screenings to have. They can also explain what the results might mean.   Answering questions - The physical is a good time to ask the doctor or nurse questions about your health. If needed, they can refer you to other doctors or specialists, too.  Adults older than 65 years often need other care, too. As you get older, your doctor will talk to you about:   How to prevent falling at " home   Hearing or vision tests   Memory testing   How to take your medicines safely   Making sure that you have the help and support you need at home  All topics are updated as new evidence becomes available and our peer review process is complete.  This topic retrieved from [employer] on: May 02, 2024.  Topic 305420 Version 1.0  Release: 32.4.3 - C32.122  © 2024 [employer] and/or its affiliates. All rights reserved.  Consumer Information Use and Disclaimer   Disclaimer: This generalized information is a limited summary of diagnosis, treatment, and/or medication information. It is not meant to be comprehensive and should be used as a tool to help the user understand and/or assess potential diagnostic and treatment options. It does NOT include all information about conditions, treatments, medications, side effects, or risks that may apply to a specific patient. It is not intended to be medical advice or a substitute for the medical advice, diagnosis, or treatment of a health care provider based on the health care provider's examination and assessment of a patient's specific and unique circumstances. Patients must speak with a health care provider for complete information about their health, medical questions, and treatment options, including any risks or benefits regarding use of medications. This information does not endorse any treatments or medications as safe, effective, or approved for treating a specific patient. [employer] and its affiliates disclaim any warranty or liability relating to this information or the use thereof.The use of this information is governed by the Terms of Use, available at https://www.woltersluma-iduwer.com/en/know/clinical-effectiveness-terms. 2024© [employer] and its affiliates and/or licensors. All rights reserved.  Copyright   © 2024 [employer] and/or its affiliates. All rights reserved.

## 2025-06-26 ENCOUNTER — VBI (OUTPATIENT)
Dept: ADMINISTRATIVE | Facility: OTHER | Age: 50
End: 2025-06-26

## 2025-06-26 NOTE — TELEPHONE ENCOUNTER
06/26/25 12:45 PM     Chart reviewed for CRC: Colonoscopy ; nothing is submitted to the patient's insurance at this time.     Milvia Sy MA   [facility]

## 2025-07-01 DIAGNOSIS — R43.8 BITTER TASTE: ICD-10-CM

## 2025-07-01 DIAGNOSIS — R49.0 HOARSENESS OF VOICE: ICD-10-CM

## 2025-07-01 DIAGNOSIS — J30.2 SEASONAL ALLERGIES: ICD-10-CM

## 2025-07-02 RX ORDER — LEVOCETIRIZINE DIHYDROCHLORIDE 5 MG/1
5 TABLET, FILM COATED ORAL EVERY EVENING
Qty: 90 TABLET | Refills: 1 | Status: SHIPPED | OUTPATIENT
Start: 2025-07-02